# Patient Record
Sex: MALE | Race: WHITE | URBAN - METROPOLITAN AREA
[De-identification: names, ages, dates, MRNs, and addresses within clinical notes are randomized per-mention and may not be internally consistent; named-entity substitution may affect disease eponyms.]

---

## 2017-11-07 ENCOUNTER — IMPORTED ENCOUNTER (OUTPATIENT)
Dept: URBAN - METROPOLITAN AREA CLINIC 1 | Facility: CLINIC | Age: 27
End: 2017-11-07

## 2017-11-07 PROBLEM — H52.13: Noted: 2017-11-07

## 2017-11-07 PROCEDURE — S0621 ROUTINE OPHTHALMOLOGICAL EXA: HCPCS
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
## 2019-01-24 ENCOUNTER — IMPORTED ENCOUNTER (OUTPATIENT)
Dept: URBAN - METROPOLITAN AREA CLINIC 1 | Facility: CLINIC | Age: 29
End: 2019-01-24

## 2019-01-24 PROBLEM — H52.223: Noted: 2019-01-24

## 2019-01-24 PROBLEM — H52.13: Noted: 2019-01-24

## 2019-01-24 PROCEDURE — S0621 ROUTINE OPHTHALMOLOGICAL EXA: HCPCS
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
## 2022-04-02 ASSESSMENT — VISUAL ACUITY
OS_SC: 20/20-2
OS_CC: J1
OD_CC: J1
OS_SC: 20/20-1
OD_SC: 20/30-2
OD_SC: 20/20

## 2022-04-02 ASSESSMENT — TONOMETRY
OS_IOP_MMHG: 14
OS_IOP_MMHG: 14
OD_IOP_MMHG: 14
OD_IOP_MMHG: 14

## 2024-03-07 ENCOUNTER — NEW PATIENT (OUTPATIENT)
Dept: URBAN - METROPOLITAN AREA CLINIC 2 | Facility: CLINIC | Age: 34
End: 2024-03-07

## 2024-03-07 DIAGNOSIS — H52.13: ICD-10-CM

## 2024-03-07 DIAGNOSIS — H52.223: ICD-10-CM

## 2024-03-07 PROCEDURE — 92015 DETERMINE REFRACTIVE STATE: CPT

## 2024-03-07 PROCEDURE — 92004 COMPRE OPH EXAM NEW PT 1/>: CPT

## 2024-03-07 PROCEDURE — 92310-3 LEVEL 3 CONTACT LENS MANAGEMENT

## 2024-03-07 ASSESSMENT — VISUAL ACUITY
OS_CC: 20/25-1
OD_CC: J1+
OD_CC: 20/25-2
OS_CC: J1+

## 2024-03-07 ASSESSMENT — KERATOMETRY
OS_AXISANGLE2_DEGREES: 143
OS_K2POWER_DIOPTERS: 43.00
OD_AXISANGLE_DEGREES: 123
OD_K2POWER_DIOPTERS: 42.75
OD_K1POWER_DIOPTERS: 42.25
OS_AXISANGLE_DEGREES: 053
OS_K1POWER_DIOPTERS: 42.75
OD_AXISANGLE2_DEGREES: 33

## 2024-03-07 ASSESSMENT — TONOMETRY
OS_IOP_MMHG: 14
OD_IOP_MMHG: 14

## 2024-03-13 ENCOUNTER — EMERGENCY VISIT (OUTPATIENT)
Dept: URBAN - METROPOLITAN AREA CLINIC 2 | Facility: CLINIC | Age: 34
End: 2024-03-13

## 2024-03-13 DIAGNOSIS — H16.002: ICD-10-CM

## 2024-03-13 PROCEDURE — 92012 INTRM OPH EXAM EST PATIENT: CPT

## 2024-03-13 RX ORDER — MOXIFLOXACIN HYDROCHLORIDE 5 MG/ML: 1 SOLUTION/ DROPS OPHTHALMIC

## 2024-03-13 ASSESSMENT — VISUAL ACUITY
OS_CC: 20/20
OD_CC: 20/20-1

## 2024-03-14 ENCOUNTER — FOLLOW UP (OUTPATIENT)
Dept: URBAN - METROPOLITAN AREA CLINIC 2 | Facility: CLINIC | Age: 34
End: 2024-03-14

## 2024-03-14 DIAGNOSIS — H16.002: ICD-10-CM

## 2024-03-14 PROCEDURE — 92012 INTRM OPH EXAM EST PATIENT: CPT

## 2024-03-14 RX ORDER — PREDNISOLONE ACETATE 10 MG/ML: 1 SUSPENSION/ DROPS OPHTHALMIC

## 2024-03-14 ASSESSMENT — VISUAL ACUITY
OS_CC: 20/20
OD_CC: 20/20

## 2024-03-18 ENCOUNTER — FOLLOW UP (OUTPATIENT)
Dept: URBAN - METROPOLITAN AREA CLINIC 2 | Facility: CLINIC | Age: 34
End: 2024-03-18

## 2024-03-18 DIAGNOSIS — H16.002: ICD-10-CM

## 2024-03-18 PROCEDURE — 92012 INTRM OPH EXAM EST PATIENT: CPT

## 2024-03-18 ASSESSMENT — TONOMETRY
OD_IOP_MMHG: 15
OS_IOP_MMHG: 15

## 2024-03-18 ASSESSMENT — VISUAL ACUITY
OD_CC: 20/20
OS_CC: 20/20-2

## 2024-03-21 ENCOUNTER — FOLLOW UP (OUTPATIENT)
Dept: URBAN - METROPOLITAN AREA CLINIC 2 | Facility: CLINIC | Age: 34
End: 2024-03-21

## 2024-03-21 DIAGNOSIS — H16.002: ICD-10-CM

## 2024-03-21 DIAGNOSIS — H04.123: ICD-10-CM

## 2024-03-21 PROCEDURE — 92012 INTRM OPH EXAM EST PATIENT: CPT

## 2024-03-21 ASSESSMENT — TONOMETRY
OS_IOP_MMHG: 15
OD_IOP_MMHG: 15

## 2024-03-21 ASSESSMENT — VISUAL ACUITY
OD_CC: 20/20-2
OS_CC: 20/20-2

## 2024-04-05 ENCOUNTER — COMPREHENSIVE EXAM (OUTPATIENT)
Dept: URBAN - METROPOLITAN AREA CLINIC 2 | Facility: CLINIC | Age: 34
End: 2024-04-05

## 2024-04-05 DIAGNOSIS — H17.9: ICD-10-CM

## 2024-04-05 DIAGNOSIS — H21.233: ICD-10-CM

## 2024-04-05 DIAGNOSIS — H04.123: ICD-10-CM

## 2024-04-05 PROCEDURE — 92014 COMPRE OPH EXAM EST PT 1/>: CPT

## 2024-04-05 PROCEDURE — 92133 CPTRZD OPH DX IMG PST SGM ON: CPT

## 2024-04-05 ASSESSMENT — TONOMETRY
OS_IOP_MMHG: 15
OD_IOP_MMHG: 16

## 2024-04-05 ASSESSMENT — VISUAL ACUITY
OD_CC: 20/20
OS_CC: 20/25